# Patient Record
(demographics unavailable — no encounter records)

---

## 2025-07-01 NOTE — PHYSICAL EXAM
[FreeTextEntry3] : PE:   General: well-appearing, alert, in no acute distress Full body skin exam performed examining scalp, head, face, ears, neck, chest, back, abdomen, axilla, b/l arms, b/l forearms, b/l hands, b/l fingernails, b/l thighs, b/l legs Pertinent findings include: -examination of the groin/genitals/buttocks, feet, toenails deferred -scattered light brown to dark brown colored <6mm papules and macules on the trunk and extremities -brown stuck on papules, plaques on the trunk and extremities -hyperpigmented patches cheeks negative hair pull test

## 2025-07-01 NOTE — HISTORY OF PRESENT ILLNESS
[FreeTextEntry1] : NPA-skin check [de-identified] : Laverne Young 36 y/o F presents for skin check here with her   Lists the following concerns: -concerned about melasma, did microneedling 2 years ago and feels the discoloration became worse has been using various products -hair loss, ongoing for 3 years. Tried oral minoxidil but had swelling under the eyes.  -several lesions of concern  Personal history of skin cancer: no Family history of skin cancer: no History of blistering sunburns: no History of tanning bed use: no Use of sunscreen regularly: yes

## 2025-07-01 NOTE — ASSESSMENT
[FreeTextEntry1] : #Melasma, chronic, flaring -I discussed the chronic nature and course of this condition - Strict photoprotection reviewed including sun-protective behaviors, protective clothing, and the daily use / reapplication of OTC TINTED broad-spectrum SPF 30+ sunscreens was advised  - Start compounded rx: Hydroquinone 4.5%, Tretinoin 0.025%, Hydrocortisone 0.5% Cream, apply once daily only to affected areas x 3 MOS only, hold for 1 month. SED including but not limited to inefficacy, irritation, erythema, peeling, unwanted lightening of the skin, paradoxical darkening of the skin (exogenous ochronosis) with prolonged use, atrophy, hypopigmentation, telangiectasias and need for strict sun protection. Cosmetic nature of the medication and out of pocket expense discussed with the patient.    #Favor AGA recent labs done with PCP - CBC, CMP, TSH, vit D wnl tried and failed topical and oral minoxidil -Start spironolactone 50 mg nightly, if tolerated increase to 50mg BID (or 100mg nightly if preferred) after 1 week. Expectation that it will take 3-6 months to see clinical effect and may need to continue to see sustained effect. Side effects discussed including menstrual irregularities, dizziness, fatigue, GI disturbances, hyperkalemia, increased urination, and breast tenderness. Reviewed risk of birth defects and to stop the medication if becomes pregnant.   #Multiple benign nevi - chronic, stable - I discussed the chronic nature and course of the condition - Photoprotection discussed, recommend daily broad-spectrum sunscreen, SPF 30 or greater, UPF hat, clothing. - Pt educated on ABCDE of melanoma - Recommend self-skin exam and annual skin exam by MD - Pt instructed to return for new or changing lesions especially if any moles start to change, itch, or bleed  # Seborrheic keratosis, trunk/extremities   -chronic, stable -I discussed the chronic nature and course of the condition -counseled on benign nature -no treatment needed unless symptomatic  RTC 3 mos for melasma and AGA f/up 
CHANGE IN LEVEL OF CONSCIOUSNESS